# Patient Record
Sex: FEMALE | Race: WHITE | NOT HISPANIC OR LATINO | Employment: FULL TIME | URBAN - METROPOLITAN AREA
[De-identification: names, ages, dates, MRNs, and addresses within clinical notes are randomized per-mention and may not be internally consistent; named-entity substitution may affect disease eponyms.]

---

## 2024-09-23 ENCOUNTER — OFFICE VISIT (OUTPATIENT)
Dept: OBGYN CLINIC | Facility: CLINIC | Age: 34
End: 2024-09-23
Payer: COMMERCIAL

## 2024-09-23 VITALS
HEIGHT: 68 IN | WEIGHT: 178 LBS | DIASTOLIC BLOOD PRESSURE: 86 MMHG | BODY MASS INDEX: 26.98 KG/M2 | SYSTOLIC BLOOD PRESSURE: 142 MMHG

## 2024-09-23 DIAGNOSIS — Z30.09 BIRTH CONTROL COUNSELING: Primary | ICD-10-CM

## 2024-09-23 PROCEDURE — 99203 OFFICE O/P NEW LOW 30 MIN: CPT | Performed by: OBSTETRICS & GYNECOLOGY

## 2024-09-23 RX ORDER — NORGESTIMATE AND ETHINYL ESTRADIOL 7DAYSX3 LO
1 KIT ORAL DAILY
Qty: 28 TABLET | Refills: 6 | Status: SHIPPED | OUTPATIENT
Start: 2024-09-23

## 2024-09-23 NOTE — ASSESSMENT & PLAN NOTE
I discussed both OCPs and IUDs for options of birth control. At this time she desires tor restart OCPs, which were called in. I reviewed her previous notes and she is due for a PAP in 10/2024 (normal PAP last year but abnormal in 2022). She will RTO in 1mo for annual and BP check. Pamphlets given for IUD and will decide if she wants to go that route.

## 2024-09-23 NOTE — PROGRESS NOTES
[facility] - [place] [address], [address], [place] [address]    Assessment/Plan:  1. Birth control counseling  Assessment & Plan:  I discussed both OCPs and IUDs for options of birth control. At this time she desires tor restart OCPs, which were called in. I reviewed her previous notes and she is due for a PAP in 10/2024 (normal PAP last year but abnormal in ). She will RTO in 1mo for annual and BP check. Pamphlets given for IUD and will decide if she wants to go that route.   Orders:  -     norgestimate-ethinyl estradiol (Ortho Tri-Cyclen Lo) 0.18/0.215/0.25 MG-25 MCG per tablet; Take 1 tablet by mouth daily      Subjective:   Codie Alicia is a 34 y.o.  .  CC:   Chief Complaint   Patient presents with    Contraception     Birth control consult       HPI: 33yo female presents for birth control consult. She has not been on anything in 2 years. She was on pills in the past. She reports using Plan B earlier this month and desires more effective form of contraception.     She reports her Bps are always elevated at the doctor office. Checks it at home and they are normal. Her repeat today is 136/84.    ROS: Negative except as noted in HPI    Patient's last menstrual period was 2024.       She  reports being sexually active and has had partner(s) who are male. She reports using the following method of birth control/protection: Condom Male.       The following portions of the patient's history were reviewed and updated as appropriate:   Past Medical History:   Diagnosis Date    Abnormal Pap smear of cervix 2012    Abnormal paps in , ,      Past Surgical History:   Procedure Laterality Date     SECTION  10/8/21     Family History   Problem Relation Age of Onset    Heart attack Mother     Heart disease Mother     Osteoarthritis Mother     Thyroid disease Mother     Heart disease Father     Heart attack Maternal Grandfather         Cause of death    Heart attack  "Maternal Grandmother     Stroke Maternal Grandmother         Cause of death    Cancer Paternal Grandfather         Prostate    Heart attack Paternal Grandfather      Social History     Socioeconomic History    Marital status:      Spouse name: None    Number of children: None    Years of education: None    Highest education level: None   Occupational History    None   Tobacco Use    Smoking status: Never    Smokeless tobacco: Never   Substance and Sexual Activity    Alcohol use: Yes     Alcohol/week: 2.0 standard drinks of alcohol     Types: 2 Standard drinks or equivalent per week    Drug use: Never    Sexual activity: Yes     Partners: Male     Birth control/protection: Condom Male   Other Topics Concern    None   Social History Narrative    None     Social Determinants of Health     Financial Resource Strain: Not on file   Food Insecurity: Not on file   Transportation Needs: Not on file   Physical Activity: Not on file   Stress: Not on file   Social Connections: Not on file   Intimate Partner Violence: Not on file   Housing Stability: Not on file     Outpatient Medications Marked as Taking for the 9/23/24 encounter (Office Visit) with Fouzia CHACKO DO   Medication    norgestimate-ethinyl estradiol (Ortho Tri-Cyclen Lo) 0.18/0.215/0.25 MG-25 MCG per tablet     Allergies   Allergen Reactions    Sulfa Antibiotics GI Intolerance and Vomiting           Objective:  /86 (BP Location: Right arm, Patient Position: Sitting, Cuff Size: Standard)   Ht 5' 8\" (1.727 m)   Wt 80.7 kg (178 lb)   LMP 09/01/2024   BMI 27.06 kg/m²          General Appearance: alert and oriented, in no acute distress.   Extremities: Normal range of motion.   Skin: normal, no rash or abnormalities  Neurologic: alert, oriented x3  Psychiatric: Appropriate affect, mood stable, cooperative with exam.        Fouzia Pastrana DO  9/23/2024 5:20 PM   "

## 2024-10-25 ENCOUNTER — ANNUAL EXAM (OUTPATIENT)
Dept: OBGYN CLINIC | Facility: CLINIC | Age: 34
End: 2024-10-25
Payer: COMMERCIAL

## 2024-10-25 VITALS
BODY MASS INDEX: 26.83 KG/M2 | WEIGHT: 177 LBS | HEIGHT: 68 IN | SYSTOLIC BLOOD PRESSURE: 138 MMHG | DIASTOLIC BLOOD PRESSURE: 78 MMHG

## 2024-10-25 DIAGNOSIS — Z01.419 WELL WOMAN EXAM: Primary | ICD-10-CM

## 2024-10-25 DIAGNOSIS — Z11.3 SCREEN FOR STD (SEXUALLY TRANSMITTED DISEASE): ICD-10-CM

## 2024-10-25 DIAGNOSIS — Z30.09 BIRTH CONTROL COUNSELING: ICD-10-CM

## 2024-10-25 PROCEDURE — S0612 ANNUAL GYNECOLOGICAL EXAMINA: HCPCS | Performed by: OBSTETRICS & GYNECOLOGY

## 2024-10-25 RX ORDER — NORGESTIMATE AND ETHINYL ESTRADIOL 7DAYSX3 LO
1 KIT ORAL DAILY
Qty: 28 TABLET | Refills: 11 | Status: SHIPPED | OUTPATIENT
Start: 2024-10-25

## 2024-10-25 NOTE — PROGRESS NOTES
[facility] - [address], [address], [place] [address]    ASSESSMENT/PLAN: Codie Alicia is a 34 y.o.  who presents for annual gynecologic exam.    Encounter for routine gynecologic examination  - Routine well woman exam completed today.  - Cervical Cancer Screening: Current ASCCP Guidelines reviewed. Last Pap: Not on file . History of abnormal: -HPV+  - HPV Vaccination status: Immunization series complete  - STI screening offered including HIV testing: GC/CT and blood work ordered  - Contraceptive counseling discussed.  Current contraception: oral progesterone-only contraceptive:   - The following were reviewed in today's visit: breast self exam, adequate intake of calcium and vitamin D, exercise, and healthy diet  - Discussed changing OCPs to monophasic if mood swings worsen    Additional problems addressed during this visit:  1. Birth control counseling  -     norgestimate-ethinyl estradiol (Ortho Tri-Cyclen Lo) 0.18/0.215/0.25 MG-25 MCG per tablet; Take 1 tablet by mouth daily      CC:  Annual Gynecologic Examination    HPI: Codie Alicia is a 34 y.o.  who presents for annual gynecologic examination. She reports doing well on OCPs, She reports her periods were moderate heavy. She reports worsening mood swings however tolerable.     ROS: Negative except as noted in HPI    Patient's last menstrual period was 10/04/2024.       She  reports being sexually active and has had partner(s) who are male. She reports using the following method of birth control/protection: Condom Male.       The following portions of the patient's history were reviewed and updated as appropriate:   Past Medical History:   Diagnosis Date    Abnormal Pap smear of cervix 2012    Abnormal paps in , ,      Past Surgical History:   Procedure Laterality Date     SECTION  10/8/21     Family History   Problem Relation Age of Onset    Heart attack Mother     Heart disease Mother      "Osteoarthritis Mother     Thyroid disease Mother     Heart disease Father     Heart attack Maternal Grandfather         Cause of death    Heart attack Maternal Grandmother     Stroke Maternal Grandmother         Cause of death    Cancer Paternal Grandfather         Prostate    Heart attack Paternal Grandfather      Social History     Socioeconomic History    Marital status:      Spouse name: None    Number of children: None    Years of education: None    Highest education level: None   Occupational History    None   Tobacco Use    Smoking status: Never    Smokeless tobacco: Never   Substance and Sexual Activity    Alcohol use: Yes     Alcohol/week: 2.0 standard drinks of alcohol     Types: 2 Standard drinks or equivalent per week    Drug use: Never    Sexual activity: Yes     Partners: Male     Birth control/protection: Condom Male   Other Topics Concern    None   Social History Narrative    None     Social Determinants of Health     Financial Resource Strain: Not on file   Food Insecurity: Not on file   Transportation Needs: Not on file   Physical Activity: Not on file   Stress: Not on file   Social Connections: Not on file   Intimate Partner Violence: Not on file   Housing Stability: Not on file     Outpatient Medications Marked as Taking for the 10/25/24 encounter (Annual Exam) with Fouzia Pastrana V DO   Medication    norgestimate-ethinyl estradiol (Ortho Tri-Cyclen Lo) 0.18/0.215/0.25 MG-25 MCG per tablet    [DISCONTINUED] norgestimate-ethinyl estradiol (Ortho Tri-Cyclen Lo) 0.18/0.215/0.25 MG-25 MCG per tablet     Allergies   Allergen Reactions    Sulfa Antibiotics GI Intolerance and Vomiting           Objective:  /78 (BP Location: Left arm, Patient Position: Sitting, Cuff Size: Standard)   Ht 5' 8\" (1.727 m)   Wt 80.3 kg (177 lb)   LMP 10/04/2024   BMI 26.91 kg/m²        Chaperone present? Pt declined    General Appearance: alert and oriented, in no acute distress.   Respiratory: Unlabored " breathing.  Abdomen: Soft, non-tender, non-distended, no masses, no rebound or guarding.  Breast Exam: No dimpling, nipple retraction or discharge. No lumps or masses. No axillary or supraclavicular nodes.   Pelvic:   External genitalia: Normal appearance, no abnormal pigmentation, no lesions or masses. Normal Bartholin's and [place]'s.      Urinary system: Urethral meatus normal,       Bladder non-tender.      Vaginal: normal mucosa without prolapse or lesions. Normal-appearing physiologic discharge.      Cervix: Normal-appearing, well-epithelialized, no gross lesions or masses. No cervical motion tenderness.      Adnexa: No adnexal masses or tenderness noted.      Uterus: Normal-sized, regular contour, midline, mobile, no uterine tenderness.  Extremities: Normal range of motion. Warm, well-perfused, non-tender.   Skin: normal, no rash or abnormalities  Neurologic: alert, oriented x3  Psychiatric: Appropriate affect, mood stable, cooperative with exam.        Fouzia Pastrana DO  10/25/2024 12:36 PM

## 2024-11-01 LAB
C TRACH RRNA CVX QL NAA+PROBE: NEGATIVE
CYTOLOGIST CVX/VAG CYTO: NORMAL
DX ICD CODE: NORMAL
HPV GENOTYPE REFLEX: NORMAL
HPV I/H RISK 4 DNA CVX QL PROBE+SIG AMP: NEGATIVE
N GONORRHOEA RRNA CVX QL NAA+PROBE: NEGATIVE
OTHER STN SPEC: NORMAL
PATH REPORT.FINAL DX SPEC: NORMAL
SL AMB NOTE:: NORMAL
SL AMB SPECIMEN ADEQUACY: NORMAL
SL AMB TEST METHODOLOGY: NORMAL